# Patient Record
Sex: FEMALE | Race: BLACK OR AFRICAN AMERICAN | NOT HISPANIC OR LATINO | Employment: FULL TIME | URBAN - METROPOLITAN AREA
[De-identification: names, ages, dates, MRNs, and addresses within clinical notes are randomized per-mention and may not be internally consistent; named-entity substitution may affect disease eponyms.]

---

## 2019-01-29 ENCOUNTER — TELEPHONE (OUTPATIENT)
Dept: OBSTETRICS AND GYNECOLOGY | Facility: CLINIC | Age: 24
End: 2019-01-29

## 2019-01-29 ENCOUNTER — OFFICE VISIT (OUTPATIENT)
Dept: OBSTETRICS AND GYNECOLOGY | Facility: CLINIC | Age: 24
End: 2019-01-29
Payer: COMMERCIAL

## 2019-01-29 VITALS
HEIGHT: 67 IN | WEIGHT: 248 LBS | DIASTOLIC BLOOD PRESSURE: 90 MMHG | BODY MASS INDEX: 38.92 KG/M2 | SYSTOLIC BLOOD PRESSURE: 132 MMHG

## 2019-01-29 DIAGNOSIS — Z12.4 ENCOUNTER FOR SCREENING FOR MALIGNANT NEOPLASM OF CERVIX: ICD-10-CM

## 2019-01-29 DIAGNOSIS — N92.0 MENORRHAGIA WITH REGULAR CYCLE: Primary | ICD-10-CM

## 2019-01-29 DIAGNOSIS — N84.1 CERVICAL POLYP: ICD-10-CM

## 2019-01-29 DIAGNOSIS — Z20.2 POSSIBLE EXPOSURE TO STD: ICD-10-CM

## 2019-01-29 DIAGNOSIS — N92.6 IRREGULAR BLEEDING: ICD-10-CM

## 2019-01-29 LAB
B-HCG UR QL: NEGATIVE
CTP QC/QA: YES

## 2019-01-29 PROCEDURE — 81025 POCT URINE PREGNANCY: ICD-10-PCS | Mod: S$GLB,,, | Performed by: OBSTETRICS & GYNECOLOGY

## 2019-01-29 PROCEDURE — 57500 PR BIOPSY CERVIX, 1 OR MORE, OR EXCISION OF LESION: ICD-10-PCS | Mod: S$GLB,,, | Performed by: OBSTETRICS & GYNECOLOGY

## 2019-01-29 PROCEDURE — 3008F BODY MASS INDEX DOCD: CPT | Mod: CPTII,S$GLB,, | Performed by: OBSTETRICS & GYNECOLOGY

## 2019-01-29 PROCEDURE — 88305 TISSUE EXAM BY PATHOLOGIST: CPT | Performed by: PATHOLOGY

## 2019-01-29 PROCEDURE — 99203 OFFICE O/P NEW LOW 30 MIN: CPT | Mod: 25,S$GLB,, | Performed by: OBSTETRICS & GYNECOLOGY

## 2019-01-29 PROCEDURE — 87480 CANDIDA DNA DIR PROBE: CPT

## 2019-01-29 PROCEDURE — 3008F PR BODY MASS INDEX (BMI) DOCUMENTED: ICD-10-PCS | Mod: CPTII,S$GLB,, | Performed by: OBSTETRICS & GYNECOLOGY

## 2019-01-29 PROCEDURE — 99999 PR PBB SHADOW E&M-EST. PATIENT-LVL III: ICD-10-PCS | Mod: PBBFAC,,, | Performed by: OBSTETRICS & GYNECOLOGY

## 2019-01-29 PROCEDURE — 99999 PR PBB SHADOW E&M-EST. PATIENT-LVL III: CPT | Mod: PBBFAC,,, | Performed by: OBSTETRICS & GYNECOLOGY

## 2019-01-29 PROCEDURE — 88305 TISSUE EXAM BY PATHOLOGIST: CPT | Mod: 26,,, | Performed by: PATHOLOGY

## 2019-01-29 PROCEDURE — 87491 CHLMYD TRACH DNA AMP PROBE: CPT

## 2019-01-29 PROCEDURE — 88305 TISSUE SPECIMEN TO PATHOLOGY, OBSTETRICS/GYNECOLOGY: ICD-10-PCS | Mod: 26,,, | Performed by: PATHOLOGY

## 2019-01-29 PROCEDURE — 57500 BIOPSY OF CERVIX: CPT | Mod: S$GLB,,, | Performed by: OBSTETRICS & GYNECOLOGY

## 2019-01-29 PROCEDURE — 88175 CYTOPATH C/V AUTO FLUID REDO: CPT

## 2019-01-29 PROCEDURE — 99203 PR OFFICE/OUTPT VISIT, NEW, LEVL III, 30-44 MIN: ICD-10-PCS | Mod: 25,S$GLB,, | Performed by: OBSTETRICS & GYNECOLOGY

## 2019-01-29 PROCEDURE — 81025 URINE PREGNANCY TEST: CPT | Mod: S$GLB,,, | Performed by: OBSTETRICS & GYNECOLOGY

## 2019-01-29 RX ORDER — NORGESTIMATE AND ETHINYL ESTRADIOL 0.25-0.035
1 KIT ORAL DAILY
Qty: 28 TABLET | Refills: 0 | Status: SHIPPED | OUTPATIENT
Start: 2019-01-29 | End: 2019-02-12 | Stop reason: SDUPTHER

## 2019-01-29 NOTE — TELEPHONE ENCOUNTER
----- Message from Nadira Clarke sent at 1/29/2019  8:32 AM CST -----  Contact: mother  Name of Who is Calling: WILMA ALDANA [[id number]]    What is the request in detail: pt returning call.. Please advise      Can the clinic reply by [facility]: no      What [facility]: [phone]

## 2019-01-29 NOTE — PROGRESS NOTES
"Chief Complaint   Patient presents with    Metrorrhagia       HPI:  23 y.o. female  presents as a new patient    Patient's last menstrual period was 01/10/2019.    - Menorrhagia with regular cycles  - Daily heavy bleeding for the past 3 weeks  - ED physician noted possible fibroids on exam  - No birth control recently    Contraception: None  Pap: 2019, No recent documented pap  Mammogram: N/A    Past Medical History:   Diagnosis Date    Anxiety     Hypertension      History reviewed. No pertinent surgical history.    Social History     Tobacco Use    Smoking status: Never Smoker   Substance Use Topics    Alcohol use: Yes     Comment: occasionally     Family History   Problem Relation Age of Onset    Breast cancer Maternal Grandmother     Breast cancer Maternal Aunt     Breast cancer Maternal Aunt     Breast cancer Maternal Aunt     Colon cancer Neg Hx     Ovarian cancer Neg Hx      OB History    Para Term  AB Living   1       1     SAB TAB Ectopic Multiple Live Births   1              # Outcome Date GA Lbr [place]/2nd Weight Sex Delivery Anes PTL Lv   2018                  MEDICATIONS: Reviewed with patient.  ALLERGIES: Benadryl [diphenhydramine hcl]     ROS:  Review of Systems   Constitutional: Negative for fever.   Respiratory: Negative for shortness of breath.    Cardiovascular: Negative for chest pain.   Gastrointestinal: Negative for abdominal pain, nausea and vomiting.   Genitourinary: Positive for menorrhagia. Negative for menstrual problem.   Neurological: Negative for headaches.       PHYSICAL EXAM:    BP (!) 132/90   Ht 5' 7" (1.702 m)   Wt 112.5 kg (248 lb 0.3 oz)   LMP 01/10/2019   BMI 38.85 kg/m²     Physical Exam:   Constitutional: She is oriented to person, place, and time. She appears well-developed.    HENT:   Head: Normocephalic.       Pulmonary/Chest: Effort normal.        Abdominal: She exhibits no mass. There is no hepatosplenomegaly. There is no " tenderness. No hernia.     Genitourinary: Vagina normal. Uterus is not enlarged and not tender. Right adnexum displays no tenderness and no fullness. Left adnexum displays no tenderness and no fullness. No vaginal discharge found. Additional cervical findings: pap smear done  Genitourinary Comments: External genitalia: Normal  Urethra: No tenderness; normal meatus  Bladder: No tenderness  Cervix: Endocervical polyp noted and biopsied               Neurological: She is alert and oriented to person, place, and time.     Psychiatric: She has a normal mood and affect.         ASSESSMENT & PLAN:   Menorrhagia with regular cycle  -     US Pelvis Complete Non OB; Future; Expected date: 01/29/2019  -     norgestimate-ethinyl estradiol (ORTHO-CYCLEN) 0.25-35 mg-mcg per tablet; Take 1 tablet by mouth once daily.  Dispense: 28 tablet; Refill: 0    Irregular bleeding  -     POCT Urine Pregnancy  -     C. trachomatis/N. gonorrhoeae by AMP DNA  -     US Pelvis Complete Non OB; Future; Expected date: 01/29/2019  -     Vaginosis Screen by DNA Probe  -     norgestimate-ethinyl estradiol (ORTHO-CYCLEN) 0.25-35 mg-mcg per tablet; Take 1 tablet by mouth once daily.  Dispense: 28 tablet; Refill: 0    Encounter for screening for malignant neoplasm of cervix  -     Liquid-based pap smear, screening    Cervical polyp  -     Biopsy of cervix  -     Tissue Specimen To Pathology, Obstetrics/Gynecology    Possible exposure to STD  -     C. trachomatis/N. gonorrhoeae by AMP DNA  -     Vaginosis Screen by DNA Probe          - History of heavy, regular bleeding and recent episode of prolonged, heavy bleeding  - Uterus poorly appreciated on exam due to body habitus; some concern for fibroids on exam in ED  - Schedule pelvic u/s  - Vaginosis and GC/chlamydia  - Cervical polyp on exam; biopsied  - Will do a short course of OCPs to stop bleeding    - Patient counseled on fibroids including prevalence, natural history, and associated symptoms such as  heavy menstrual bleeding, irregular bleeding, pain, bloating, and constipation  - Patient counseled on management options for fibroids including   -- Medical management with NSAIDs, combined hormonal contraception such as the pill, patch, or ring, DepoProvera injections, or the Mirena IUD    - Patient is undecided on management at this time    - Pap done today    Total visit time was 30 minutes with greater than 50% of time dedicated to counseling.

## 2019-01-29 NOTE — TELEPHONE ENCOUNTER
No answer. Message left to call clinic back.     Dr Moffett is not in clinic and appt will need to be reschedule.

## 2019-01-30 LAB
CANDIDA RRNA VAG QL PROBE: NEGATIVE
G VAGINALIS RRNA GENITAL QL PROBE: NEGATIVE
T VAGINALIS RRNA GENITAL QL PROBE: NEGATIVE

## 2019-01-31 LAB
C TRACH DNA SPEC QL NAA+PROBE: NOT DETECTED
N GONORRHOEA DNA SPEC QL NAA+PROBE: NOT DETECTED

## 2019-02-01 NOTE — PROCEDURES
Biopsy of cervix  Date/Time: 2/1/2019 2:34 PM  Performed by: CHARANJIT Hutchison MD  Authorized by: CHARANJIT Hutchison MD   Local anesthesia used: no    Anesthesia:  Local anesthesia used: no    Sedation:  Patient sedated: no    Patient tolerance: Patient tolerated the procedure well with no immediate complications        Endocervical polyp noted on exam.  Patient counseled on biopsy risks including pain and bleeding.  Polyp removed with Tischler biopsy forceps across the base.  Hemostasis achieved with Monsel's.  Procedure tolerated well.

## 2019-02-05 ENCOUNTER — TELEPHONE (OUTPATIENT)
Dept: OBSTETRICS AND GYNECOLOGY | Facility: CLINIC | Age: 24
End: 2019-02-05

## 2019-02-05 NOTE — TELEPHONE ENCOUNTER
----- Message from Ian Chew sent at 2/5/2019  4:16 PM CST -----  Name of Who is Calling:WILMA ALDANA [[id number]]      What is the request in detail:[facility]Please contact to further discuss and advise        Can the clinic reply by [facility]:N      What Number to Call Back if not in [facility]:[phone]

## 2019-02-05 NOTE — TELEPHONE ENCOUNTER
----- Message from CHARANJIT Hutchison MD sent at 2/5/2019 12:01 PM CST -----  Please notify patient that her pap, gonorrhea and chlamydia tests, and her biopsy were all normal.  Thanks.

## 2019-02-11 PROBLEM — D50.9 MICROCYTIC ANEMIA: Status: ACTIVE | Noted: 2019-02-11

## 2019-02-11 PROBLEM — N92.0 MENORRHAGIA: Status: ACTIVE | Noted: 2019-02-11

## 2019-02-11 NOTE — PROGRESS NOTES
Subjective:       Patient ID: Ellie Ba is a 23 y.o. female who  has a past medical history of Anxiety and Hypertension.    Chief Complaint: Establish Care and Hypertension     HPI    History was obtained from the patient and supplemented through chart review.  The patient has not seen a PCP in our system.  PCP was in [place].  Follows with OBGYN for menorrhagia, endocervical polyp.    Just moved to [place].  Works as a .  Has not taken the [place] bar exam yet.  Currently taking public transportation, car pooling to pay student loans.    HTN:  Used to take benazepril 10, but switched to Benicar/olmesartan 20 due to association with lung cancer.  (neither are in med recall list). Tolerating meds well.  Compliant with meds.  Pt denies cp/sob/ha/vision or neuro changes. Denies lightheadedness, dizziness.  Has BP cuff at home, but doesn't remember BP values.  On OCPs.  +FHx of HTN.  BMI 38.    Anxiety:  Used to take Zoloft 25.  Mood has been fine, especially since she moved to [place].  Has anxiety from time to time, but tries to do something active.    Menorrhagia, Endocervical polyp:  Follows with OBGYN.  Polyp was removed ; pathology benign.  On birth control.  Still spotting with bloated, menstrual cramps.  OBGYN prescribed 28 days of OCPs with plans to switch to Depo-Provera due to hypertension.    Microcytic anemia:  Does report SMITH, lightheadedness when she is on her period. Denies CP.  Denies PICA syndrome. Reports family history of menorrhagia.  The patient denies hematochezia, melena, hematuria.  Taking B12 supplements and PO iron once a day.    Obesity:  BMI 38.  Eats balanced.  Also runs in the yard with a dog but otherwise not exercising regularly.    Review of Systems   Constitutional: Negative for fever and unexpected weight change.   HENT: Negative for rhinorrhea and sneezing.    Eyes: Negative for redness and itching.   Respiratory: Negative for shortness of breath and wheezing.   "  Cardiovascular: Negative for chest pain and palpitations.   Gastrointestinal: Negative for abdominal pain and blood in stool.   Genitourinary: Positive for menstrual problem. Negative for dysuria.   Musculoskeletal: Negative for gait problem and joint swelling.   Skin: Negative for color change and rash.   Neurological: Negative for dizziness and light-headedness.   Hematological: Negative for adenopathy.   Psychiatric/Behavioral: Negative for dysphoric mood and self-injury.       Past Medical History:   Diagnosis Date    Anxiety     Hypertension      Past Surgical History:   Procedure Laterality Date    NO PAST SURGERIES       Family History   Problem Relation Age of Onset    Breast cancer Maternal Grandmother     Breast cancer Maternal Aunt     Fibroids Maternal Aunt     Breast cancer Maternal Aunt     Breast cancer Maternal Aunt     Hypertension Mother     Fibroids Mother     Hypertension Father     Diabetes Paternal Grandfather     Colon cancer Neg Hx     Ovarian cancer Neg Hx     Heart disease Neg Hx      Social History     Socioeconomic History    Marital status: Single     Spouse name: Not on file    Number of children: Not on file    Years of education: Not on file    Highest education level: Not on file   Social Needs    Financial resource strain: Not on file    Food insecurity - worry: Not on file    Food insecurity - inability: Not on file    Transportation needs - medical: Not on file    Transportation needs - non-medical: Not on file   Occupational History    Not on file   Tobacco Use    Smoking status: Never Smoker   Substance and Sexual Activity    Alcohol use: Yes     Comment: occasionally    Drug use: No    Sexual activity: Not Currently   Other Topics Concern    Not on file   Social History Narrative    Not on file     Objective:      Vitals:    02/12/19 1404   BP: 138/80   Pulse: 96   SpO2: 100%   Weight: 111.1 kg (244 lb 14.9 oz)   Height: 5' 7" (1.702 m) "      Physical Exam   Constitutional: She appears well-developed and well-nourished. No distress.   HENT:   Head: Normocephalic and atraumatic.   Nose: Nose normal.   Mouth/Throat: Oropharynx is clear and moist. No oropharyngeal exudate.   Eyes: Conjunctivae and EOM are normal. Pupils are equal, round, and reactive to light. Right eye exhibits no discharge. Left eye exhibits no discharge. No scleral icterus.   Neck: Neck supple. No tracheal deviation present. No thyromegaly present.   Cardiovascular: Normal rate, regular rhythm, normal heart sounds and intact distal pulses.   No murmur heard.  Pulmonary/Chest: Effort normal and breath sounds normal. No respiratory distress. She has no wheezes.   Abdominal: Soft. Bowel sounds are normal. There is no tenderness.   Musculoskeletal: She exhibits no edema or deformity.   Lymphadenopathy:     She has no cervical adenopathy.   Neurological: She is alert. No cranial nerve deficit. Gait normal.   Skin: Skin is warm and dry. Capillary refill takes less than 2 seconds. She is not diaphoretic. No erythema.   Psychiatric: She has a normal mood and affect. Her behavior is normal.         Lab Results   Component Value Date    WBC 6.60 01/23/2019    HGB 10.9 (L) 01/23/2019    HCT 34.5 (L) 01/23/2019     (H) 01/23/2019       The ASCVD Risk score (Grafton DC Jr., et al., 2013) failed to calculate for the following reasons:    The 2013 ASCVD risk score is only valid for ages 40 to 79    (Imaging have been independently reviewed)  Pelvic ultrasound ordered but not yet performed; patient will call billing.    Assessment:       1. Essential hypertension    2. Anxiety    3. Menorrhagia with regular cycle    4. Irregular bleeding    5. Microcytic anemia    6. Severe obesity (BMI 35.0-39.9) with comorbidity    7. Healthcare maintenance    8. Encounter for lipid screening for cardiovascular disease    9. Encounter for screening for diabetes mellitus    10. Screening for thyroid disorder           Plan:       Ellie was seen today for establish care and hypertension.    Diagnoses and all orders for this visit:    Essential hypertension  Comments:  Above goal. Increase Benicar from 20 ->40. Dig HTN. On OCPs. Instructed pt to inform us if she decides to have children in the future since she is on ARB  Orders:  -     olmesartan (BENICAR) 40 MG tablet; Take 1 tablet (40 mg total) by mouth once daily.  -     NURSING COMMUNICATION: Create [employer] Account  -     Hypertension Digital Medicine ([facility]) Enrollment Order  -     Hypertension Digital Medicine ([facility]): Assign Onboarding Questionnaires    Anxiety  Comments:  Well controlled off Zoloft 25    Menorrhagia with regular cycle  Comments:  Endocervical polyp removed, path benign. Still spotting on OCPs. Defer to OBGYN to increase dose vs. switch to alternate form. Pt will call billing for TV[facility]  Orders:  -     Ferritin; Future  -     Iron and TIBC; Future  -     Vitamin B12; Future  -     TSH; Future  -     norgestimate-ethinyl estradiol (ORTHO-CYCLEN) 0.25-35 mg-mcg per tablet; Take 1 tablet by mouth once daily.    Irregular bleeding  Comments:  As above  Orders:  -     norgestimate-ethinyl estradiol (ORTHO-CYCLEN) 0.25-35 mg-mcg per tablet; Take 1 tablet by mouth once daily.    Microcytic anemia  Comments:  Likely secondary to menorrhagia.  Currently taking B12 and p.o. iron once a day  Orders:  -     Ferritin; Future  -     Iron and TIBC; Future  -     Vitamin B12; Future    Severe obesity (BMI 35.0-39.9) with comorbidity  Comments:  Eating has improved.  Advised to exercise more regularly    Healthcare maintenance  -     Comprehensive metabolic panel; Future    Encounter for lipid screening for cardiovascular disease  Comments:  Checking due to hypertension and obesity  Orders:  -     Lipid panel; Future    Encounter for screening for diabetes mellitus  Comments:  Checking due to hypertension and obesity  Orders:  -     Hemoglobin A1c;  Future    Screening for thyroid disorder  Comments:  Checking due to menorrhagia, weight  Orders:  -     TSH; Future         Side effects of medication(s) were discussed in detail and patient voiced understanding.  Patient will call back for any issues or complications.     RTC in 3 month(s) or sooner PRN for HTN.

## 2019-02-12 ENCOUNTER — OFFICE VISIT (OUTPATIENT)
Dept: INTERNAL MEDICINE | Facility: CLINIC | Age: 24
End: 2019-02-12
Payer: COMMERCIAL

## 2019-02-12 ENCOUNTER — PATIENT MESSAGE (OUTPATIENT)
Dept: ADMINISTRATIVE | Facility: OTHER | Age: 24
End: 2019-02-12

## 2019-02-12 VITALS
SYSTOLIC BLOOD PRESSURE: 138 MMHG | HEIGHT: 67 IN | WEIGHT: 244.94 LBS | OXYGEN SATURATION: 100 % | BODY MASS INDEX: 38.44 KG/M2 | HEART RATE: 96 BPM | DIASTOLIC BLOOD PRESSURE: 80 MMHG

## 2019-02-12 DIAGNOSIS — Z13.6 ENCOUNTER FOR LIPID SCREENING FOR CARDIOVASCULAR DISEASE: ICD-10-CM

## 2019-02-12 DIAGNOSIS — Z00.00 HEALTHCARE MAINTENANCE: ICD-10-CM

## 2019-02-12 DIAGNOSIS — Z13.1 ENCOUNTER FOR SCREENING FOR DIABETES MELLITUS: ICD-10-CM

## 2019-02-12 DIAGNOSIS — Z13.220 ENCOUNTER FOR LIPID SCREENING FOR CARDIOVASCULAR DISEASE: ICD-10-CM

## 2019-02-12 DIAGNOSIS — Z13.29 SCREENING FOR THYROID DISORDER: ICD-10-CM

## 2019-02-12 DIAGNOSIS — F41.9 ANXIETY: ICD-10-CM

## 2019-02-12 DIAGNOSIS — N92.0 MENORRHAGIA WITH REGULAR CYCLE: ICD-10-CM

## 2019-02-12 DIAGNOSIS — N92.6 IRREGULAR BLEEDING: ICD-10-CM

## 2019-02-12 DIAGNOSIS — I10 ESSENTIAL HYPERTENSION: Primary | ICD-10-CM

## 2019-02-12 DIAGNOSIS — D50.9 MICROCYTIC ANEMIA: ICD-10-CM

## 2019-02-12 DIAGNOSIS — E66.01 SEVERE OBESITY (BMI 35.0-39.9) WITH COMORBIDITY: ICD-10-CM

## 2019-02-12 PROBLEM — E66.9 OBESITY (BMI 30-39.9): Status: ACTIVE | Noted: 2019-02-12

## 2019-02-12 PROCEDURE — 3079F PR MOST RECENT DIASTOLIC BLOOD PRESSURE 80-89 MM HG: ICD-10-PCS | Mod: CPTII,S$GLB,, | Performed by: INTERNAL MEDICINE

## 2019-02-12 PROCEDURE — 99999 PR PBB SHADOW E&M-EST. PATIENT-LVL IV: CPT | Mod: PBBFAC,,, | Performed by: INTERNAL MEDICINE

## 2019-02-12 PROCEDURE — 3075F SYST BP GE 130 - 139MM HG: CPT | Mod: CPTII,S$GLB,, | Performed by: INTERNAL MEDICINE

## 2019-02-12 PROCEDURE — 99205 PR OFFICE/OUTPT VISIT, NEW, LEVL V, 60-74 MIN: ICD-10-PCS | Mod: S$GLB,,, | Performed by: INTERNAL MEDICINE

## 2019-02-12 PROCEDURE — 99999 PR PBB SHADOW E&M-EST. PATIENT-LVL IV: ICD-10-PCS | Mod: PBBFAC,,, | Performed by: INTERNAL MEDICINE

## 2019-02-12 PROCEDURE — 3079F DIAST BP 80-89 MM HG: CPT | Mod: CPTII,S$GLB,, | Performed by: INTERNAL MEDICINE

## 2019-02-12 PROCEDURE — 3008F BODY MASS INDEX DOCD: CPT | Mod: CPTII,S$GLB,, | Performed by: INTERNAL MEDICINE

## 2019-02-12 PROCEDURE — 99205 OFFICE O/P NEW HI 60 MIN: CPT | Mod: S$GLB,,, | Performed by: INTERNAL MEDICINE

## 2019-02-12 PROCEDURE — 3008F PR BODY MASS INDEX (BMI) DOCUMENTED: ICD-10-PCS | Mod: CPTII,S$GLB,, | Performed by: INTERNAL MEDICINE

## 2019-02-12 PROCEDURE — 3075F PR MOST RECENT SYSTOLIC BLOOD PRESS GE 130-139MM HG: ICD-10-PCS | Mod: CPTII,S$GLB,, | Performed by: INTERNAL MEDICINE

## 2019-02-12 RX ORDER — NORGESTIMATE AND ETHINYL ESTRADIOL 0.25-0.035
1 KIT ORAL DAILY
Qty: 28 TABLET | Refills: 2 | Status: SHIPPED | OUTPATIENT
Start: 2019-02-12 | End: 2019-05-17 | Stop reason: SDUPTHER

## 2019-02-12 RX ORDER — OLMESARTAN MEDOXOMIL 40 MG/1
40 TABLET ORAL DAILY
Qty: 90 TABLET | Refills: 0 | Status: SHIPPED | OUTPATIENT
Start: 2019-02-12 | End: 2019-05-21 | Stop reason: SDUPTHER

## 2019-02-12 NOTE — PATIENT INSTRUCTIONS
[place]: [employer]  YouTube: Fitness , [employer], [employer], [employer]

## 2019-02-13 ENCOUNTER — PATIENT MESSAGE (OUTPATIENT)
Dept: ADMINISTRATIVE | Facility: OTHER | Age: 24
End: 2019-02-13

## 2019-02-13 ENCOUNTER — PATIENT MESSAGE (OUTPATIENT)
Dept: INTERNAL MEDICINE | Facility: CLINIC | Age: 24
End: 2019-02-13

## 2019-02-14 ENCOUNTER — PATIENT OUTREACH (OUTPATIENT)
Dept: OTHER | Facility: OTHER | Age: 24
End: 2019-02-14

## 2019-02-14 NOTE — PROGRESS NOTES
1st attempt for enrollment call. Left voicemail.         Last 5 Patient Entered Readings                                      Current 30 Day Average: 153/101     Recent Readings 2/12/2019    SBP (mmHg) 153    DBP (mmHg) 101    Pulse 75

## 2019-02-14 NOTE — LETTER
April 9, 2019     Ellie Ba  [address] [id number]       Dear Ellie,    Thank you for enrolling in [facility]s Digital Medicine Program. To participate, we ask that you submit information at least once weekly through your [facility] account and maintain regular contact with your Care Team. We have not received any data or heard from you in some time.     The Digital Medicine Care Team has attempted to reach you on multiple occasions to determine if you would like to continue participating in the program. While we encourage you to continue participating fully, we understand that circumstances may change.     To continue participating in the program, please contact me at [phone]. If we do not hear back, you will be un-enrolled, and your physician will be notified of your decision.    If you have submitted data and believe you are receiving this letter in error, please call the Digital Medicine Patient Support Line at [phone] for troubleshooting.      We look forward to hearing from you soon.    Sincerely,     Steven Garay  [facility]   [phone]

## 2019-03-26 NOTE — PROGRESS NOTES
Last 5 Patient Entered Readings                                      Current 30 Day Average: 159/99     Recent Readings 2/27/2019 2/13/2019 2/13/2019 2/12/2019    SBP (mmHg) 159 160 153 153    DBP (mmHg) 99 102 105 101    Pulse 86 99 87 75          3/26: 2nd attempt.  Sent Sparling Studiot.  Will call in 1 week to complete health  introduction.

## 2019-04-02 NOTE — PROGRESS NOTES
Last 5 Patient Entered Readings                                      Current 30 Day Average:      Recent Readings 2/27/2019 2/13/2019 2/13/2019 2/12/2019    SBP (mmHg) 159 160 153 153    DBP (mmHg) 99 102 105 101    Pulse 86 99 87 75          4/2: 3rd attempt.  LVM.  Will call in 1 week to complete health  introduction

## 2019-04-09 NOTE — PROGRESS NOTES
Last 5 Patient Entered Readings                                      Current 30 Day Average:      Recent Readings 2/27/2019 2/13/2019 2/13/2019 2/12/2019    SBP (mmHg) 159 160 153 153    DBP (mmHg) 99 102 105 101    Pulse 86 99 87 75          4/9: 4th attempt.  LVM.  Non compliance.  If no response in 2 weeks, [facility]

## 2019-04-23 NOTE — PROGRESS NOTES
Last 5 Patient Entered Readings                                      Current 30 Day Average:      Recent Readings 2/27/2019 2/13/2019 2/13/2019 2/12/2019    SBP (mmHg) 159 160 153 153    DBP (mmHg) 99 102 105 101    Pulse 86 99 87 75        4/23: No response from patient.  [facility].

## 2019-05-03 ENCOUNTER — PATIENT MESSAGE (OUTPATIENT)
Dept: ADMINISTRATIVE | Facility: HOSPITAL | Age: 24
End: 2019-05-03

## 2019-05-03 ENCOUNTER — PATIENT OUTREACH (OUTPATIENT)
Dept: ADMINISTRATIVE | Facility: HOSPITAL | Age: 24
End: 2019-05-03

## 2019-05-17 DIAGNOSIS — N92.0 MENORRHAGIA WITH REGULAR CYCLE: ICD-10-CM

## 2019-05-17 DIAGNOSIS — N92.6 IRREGULAR BLEEDING: ICD-10-CM

## 2019-05-17 RX ORDER — NORGESTIMATE AND ETHINYL ESTRADIOL 0.25-0.035
KIT ORAL
Qty: 28 TABLET | Refills: 1 | Status: SHIPPED | OUTPATIENT
Start: 2019-05-17

## 2019-05-17 NOTE — TELEPHONE ENCOUNTER
LVM stating that her meds was refilled but she needs to see OBGYN to get it adjusted due her menstrual cycle

## 2019-05-21 DIAGNOSIS — I10 ESSENTIAL HYPERTENSION: ICD-10-CM

## 2019-05-21 RX ORDER — OLMESARTAN MEDOXOMIL 40 MG/1
TABLET ORAL
Qty: 30 TABLET | Refills: 0 | Status: SHIPPED | OUTPATIENT
Start: 2019-05-21

## 2019-05-21 NOTE — TELEPHONE ENCOUNTER
Spoke with Pt and she moved to [place]  Pt said she's been doing a lot better and has loss a lot of weight

## 2021-04-28 ENCOUNTER — PATIENT MESSAGE (OUTPATIENT)
Dept: RESEARCH | Facility: HOSPITAL | Age: 26
End: 2021-04-28